# Patient Record
Sex: MALE | Race: WHITE
[De-identification: names, ages, dates, MRNs, and addresses within clinical notes are randomized per-mention and may not be internally consistent; named-entity substitution may affect disease eponyms.]

---

## 2018-04-25 ENCOUNTER — HOSPITAL ENCOUNTER (OUTPATIENT)
Age: 43
Discharge: HOME | End: 2018-04-25

## 2018-04-25 ENCOUNTER — HOSPITAL ENCOUNTER (OUTPATIENT)
Dept: HOSPITAL 91 - SDS | Age: 43
Discharge: HOME | End: 2018-04-25
Payer: COMMERCIAL

## 2018-04-25 DIAGNOSIS — I10: ICD-10-CM

## 2018-04-25 DIAGNOSIS — T84.84XA: Primary | ICD-10-CM
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
## 2018-04-25 PROCEDURE — 20680 REMOVAL OF IMPLANT DEEP: CPT

## 2018-04-25 PROCEDURE — 73560 X-RAY EXAM OF KNEE 1 OR 2: CPT

## 2018-04-25 PROCEDURE — 88300 SURGICAL PATH GROSS: CPT

## 2018-04-25 RX ADMIN — LIDOCAINE HYDROCHLORIDE 1 ML: 10; .005 INJECTION, SOLUTION EPIDURAL; INFILTRATION; INTRACAUDAL; PERINEURAL at 15:39

## 2018-04-25 RX ADMIN — BACITRACIN ZINC, AND POLYMYXIN B SULFATE 1 APPLIC: 500; 10000 OINTMENT TOPICAL at 15:44

## 2018-04-25 RX ADMIN — FENTANYL CITRATE 1 MCG: 50 INJECTION, SOLUTION INTRAMUSCULAR; INTRAVENOUS at 16:24

## 2018-04-25 RX ADMIN — BUPIVACAINE HYDROCHLORIDE 1 ML: 2.5 INJECTION, SOLUTION EPIDURAL; INFILTRATION; INTRACAUDAL; PERINEURAL at 15:39

## 2018-04-25 RX ADMIN — HYDROMORPHONE HYDROCHLORIDE 1 MG: 2 INJECTION INTRAMUSCULAR; INTRAVENOUS; SUBCUTANEOUS at 16:20

## 2018-04-25 RX ADMIN — BACITRACIN 1 ML: 50000 INJECTION, POWDER, FOR SOLUTION INTRAMUSCULAR at 15:39

## 2018-04-25 RX ADMIN — MEPERIDINE HYDROCHLORIDE 1 MG: 25 INJECTION, SOLUTION INTRAMUSCULAR; INTRAVENOUS; SUBCUTANEOUS at 16:24

## 2018-04-25 RX ADMIN — HYDROMORPHONE HYDROCHLORIDE 1 MG: 2 INJECTION INTRAMUSCULAR; INTRAVENOUS; SUBCUTANEOUS at 16:05

## 2018-09-15 ENCOUNTER — HOSPITAL ENCOUNTER (EMERGENCY)
Dept: HOSPITAL 91 - E/R | Age: 43
Discharge: HOME | End: 2018-09-15
Payer: COMMERCIAL

## 2018-09-15 ENCOUNTER — HOSPITAL ENCOUNTER (EMERGENCY)
Age: 43
Discharge: HOME | End: 2018-09-15

## 2018-09-15 DIAGNOSIS — M25.462: Primary | ICD-10-CM

## 2018-09-15 DIAGNOSIS — Z87.891: ICD-10-CM

## 2018-09-15 DIAGNOSIS — M17.12: ICD-10-CM

## 2018-09-15 PROCEDURE — 20610 DRAIN/INJ JOINT/BURSA W/O US: CPT

## 2018-09-15 PROCEDURE — 99283 EMERGENCY DEPT VISIT LOW MDM: CPT

## 2018-09-15 RX ADMIN — LIDOCAINE HYDROCHLORIDE,EPINEPHRINE BITARTRATE 1 ML: 10; .01 INJECTION, SOLUTION INFILTRATION; PERINEURAL at 19:54

## 2019-10-17 ENCOUNTER — HOSPITAL ENCOUNTER (INPATIENT)
Dept: HOSPITAL 54 - ER | Age: 44
LOS: 1 days | Discharge: HOME HEALTH | DRG: 857 | End: 2019-10-18
Attending: NURSE PRACTITIONER | Admitting: NURSE PRACTITIONER
Payer: COMMERCIAL

## 2019-10-17 VITALS — DIASTOLIC BLOOD PRESSURE: 93 MMHG | SYSTOLIC BLOOD PRESSURE: 146 MMHG

## 2019-10-17 VITALS — HEIGHT: 74 IN | BODY MASS INDEX: 23.61 KG/M2 | WEIGHT: 184 LBS
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
## 2019-10-17 DIAGNOSIS — E87.6: ICD-10-CM

## 2019-10-17 DIAGNOSIS — T81.42XA: Primary | ICD-10-CM
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
## 2019-10-17 DIAGNOSIS — S81.002A: ICD-10-CM

## 2019-10-17 DIAGNOSIS — I10: ICD-10-CM
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
## 2019-10-17 DIAGNOSIS — D63.8: ICD-10-CM

## 2019-10-17 DIAGNOSIS — M00.9: ICD-10-CM

## 2019-10-17 LAB
BASOPHILS # BLD AUTO: 0.1 /CMM (ref 0–0.2)
BASOPHILS NFR BLD AUTO: 0.8 % (ref 0–2)
BUN SERPL-MCNC: 11 MG/DL (ref 7–18)
CALCIUM SERPL-MCNC: 8.8 MG/DL (ref 8.5–10.1)
CHLORIDE SERPL-SCNC: 102 MMOL/L (ref 98–107)
CO2 SERPL-SCNC: 24 MMOL/L (ref 21–32)
CREAT SERPL-MCNC: 1.1 MG/DL (ref 0.6–1.3)
EOSINOPHIL NFR BLD AUTO: 0.8 % (ref 0–6)
GLUCOSE SERPL-MCNC: 94 MG/DL (ref 74–106)
HCT VFR BLD AUTO: 37 % (ref 39–51)
HGB BLD-MCNC: 12.7 G/DL (ref 13.5–17.5)
LYMPHOCYTES NFR BLD AUTO: 18.9 % (ref 20–44)
LYMPHOCYTES NFR BLD AUTO: 2 /CMM (ref 0.8–4.8)
MCHC RBC AUTO-ENTMCNC: 34 G/DL (ref 31–36)
MCV RBC AUTO: 95 FL (ref 80–96)
MONOCYTES NFR BLD AUTO: 1.2 /CMM (ref 0.1–1.3)
MONOCYTES NFR BLD AUTO: 11.1 % (ref 2–12)
NEUTROPHILS # BLD AUTO: 7.2 /CMM (ref 1.8–8.9)
NEUTROPHILS NFR BLD AUTO: 68.4 % (ref 43–81)
PLATELET # BLD AUTO: 513 /CMM (ref 150–450)
POTASSIUM SERPL-SCNC: 3.2 MMOL/L (ref 3.5–5.1)
RBC # BLD AUTO: 3.95 MIL/UL (ref 4.5–6)
SODIUM SERPL-SCNC: 137 MMOL/L (ref 136–145)
WBC NRBC COR # BLD AUTO: 10.5 K/UL (ref 4.3–11)

## 2019-10-17 PROCEDURE — A6253 ABSORPT DRG > 48 SQ IN W/O B: HCPCS

## 2019-10-17 PROCEDURE — A4217 STERILE WATER/SALINE, 500 ML: HCPCS

## 2019-10-17 PROCEDURE — G0378 HOSPITAL OBSERVATION PER HR: HCPCS

## 2019-10-17 PROCEDURE — A4216 STERILE WATER/SALINE, 10 ML: HCPCS

## 2019-10-17 NOTE — NUR
PT RECEIVED FROM CLAUDE PEREIRA FOR GOLDY. IN BED AAOX4. NAD. AMBULATED TO BATHROOM ON 
HIS OWN WITH AN AIDE OF A CANE.

## 2019-10-17 NOTE — NUR
RECEIVED PATIENT FROM ER FOR LEFT KNEE WASHING. AO X 3, ABLE TO MAKE NEEDS KNOW; GOOD 
HISTORIAN. NO ACUTE DISTRESS NOTED. MONITORED OF PAIN. IV LINE PATENT, INTACT; FLUSHED. LEFT 
MEDIAL KNEE WOUND COVERED WITH WOUND VAC DRESSING AND ATTACHED TO PORTABLE WOUND VAC; 
DRAINING TEA-COLORED FLUID. OTHERWISE, SKIN IS INTACT. SAFETY REMINDERS GIVEN. ORIENTATION 
TO ROOM AND UNIT GIVEN. ON LOW BED WITH BILATERAL UPPER SIDE RAILS UP. CALL BELL WITHIN EASY 
REACH. WILL CONTINUE TO MONITOR.

## 2019-10-17 NOTE — NUR
PT CAME IN FOR PRE-OP LABS FOR POSSIBLE SURGERY FOR TOMORROW. AAOX4. VSS. NO 
SOB. NO DISTRESS NOTED. PT HAS A MED VAC ON LEFT LEG. WILL CONTINUE TO MONITOR

## 2019-10-17 NOTE — NUR
REPORT GIVEN  TO REGINA, RN FOR GOLDY. PT TO UNIT ON [facility] WITH EMT AT BEDSIDE. PT 
IS STABLE FOR TRANSPORT.

## 2019-10-18 VITALS — SYSTOLIC BLOOD PRESSURE: 154 MMHG | DIASTOLIC BLOOD PRESSURE: 96 MMHG

## 2019-10-18 VITALS — DIASTOLIC BLOOD PRESSURE: 77 MMHG | SYSTOLIC BLOOD PRESSURE: 147 MMHG

## 2019-10-18 LAB
BASOPHILS # BLD AUTO: 0 /CMM (ref 0–0.2)
BASOPHILS NFR BLD AUTO: 0.6 % (ref 0–2)
BUN SERPL-MCNC: 9 MG/DL (ref 7–18)
CALCIUM SERPL-MCNC: 8.6 MG/DL (ref 8.5–10.1)
CHLORIDE SERPL-SCNC: 104 MMOL/L (ref 98–107)
CHOLEST SERPL-MCNC: 121 MG/DL (ref ?–200)
CO2 SERPL-SCNC: 23 MMOL/L (ref 21–32)
CREAT SERPL-MCNC: 0.7 MG/DL (ref 0.6–1.3)
EOSINOPHIL NFR BLD AUTO: 3.5 % (ref 0–6)
GLUCOSE SERPL-MCNC: 109 MG/DL (ref 74–106)
HCT VFR BLD AUTO: 37 % (ref 39–51)
HDLC SERPL-MCNC: 59 MG/DL (ref 40–60)
HGB BLD-MCNC: 12.2 G/DL (ref 13.5–17.5)
LDLC SERPL DIRECT ASSAY-MCNC: 61 MG/DL (ref 0–99)
LYMPHOCYTES NFR BLD AUTO: 2.1 /CMM (ref 0.8–4.8)
LYMPHOCYTES NFR BLD AUTO: 27.2 % (ref 20–44)
MAGNESIUM SERPL-MCNC: 1.6 MG/DL (ref 1.8–2.4)
MCHC RBC AUTO-ENTMCNC: 33 G/DL (ref 31–36)
MCV RBC AUTO: 95 FL (ref 80–96)
MONOCYTES NFR BLD AUTO: 1 /CMM (ref 0.1–1.3)
MONOCYTES NFR BLD AUTO: 12.6 % (ref 2–12)
NEUTROPHILS # BLD AUTO: 4.3 /CMM (ref 1.8–8.9)
NEUTROPHILS NFR BLD AUTO: 56.1 % (ref 43–81)
PHOSPHATE SERPL-MCNC: 3.9 MG/DL (ref 2.5–4.9)
PLATELET # BLD AUTO: 476 /CMM (ref 150–450)
POTASSIUM SERPL-SCNC: 3.9 MMOL/L (ref 3.5–5.1)
RBC # BLD AUTO: 3.87 MIL/UL (ref 4.5–6)
SODIUM SERPL-SCNC: 137 MMOL/L (ref 136–145)
TRIGL SERPL-MCNC: 71 MG/DL (ref 30–150)
WBC NRBC COR # BLD AUTO: 7.6 K/UL (ref 4.3–11)

## 2019-10-18 RX ADMIN — DEXTROSE MONOHYDRATE SCH MLS/HR: 50 INJECTION, SOLUTION INTRAVENOUS at 15:33

## 2019-10-18 RX ADMIN — Medication PRN MG: at 11:12

## 2019-10-18 RX ADMIN — PROPRANOLOL HYDROCHLORIDE SCH MG: 10 TABLET ORAL at 13:00

## 2019-10-18 RX ADMIN — DEXTROSE MONOHYDRATE SCH MLS/HR: 50 INJECTION, SOLUTION INTRAVENOUS at 05:20

## 2019-10-18 RX ADMIN — Medication PRN MG: at 07:00

## 2019-10-18 RX ADMIN — Medication PRN MG: at 02:58

## 2019-10-18 RX ADMIN — PROPRANOLOL HYDROCHLORIDE SCH MG: 10 TABLET ORAL at 09:00

## 2019-10-18 RX ADMIN — MAGNESIUM SULFATE IN DEXTROSE SCH MLS/HR: 10 INJECTION, SOLUTION INTRAVENOUS at 11:07

## 2019-10-18 RX ADMIN — MAGNESIUM SULFATE IN DEXTROSE SCH MLS/HR: 10 INJECTION, SOLUTION INTRAVENOUS at 10:05

## 2019-10-18 NOTE — NUR
RN NOTES

PT RETURNED TO THE UNIT. WOUND VAC IN PLACE. PER DR THOMPSON PT GABBIE TO DISCHARGE. CHANGED DIET 
TO CARDIAC, WILL FED PATIENT. INFORMED DR ZIMMERMAN. PER DR ELSIE CARTWRIGHT TO PUT IN ORDER FOR 
DISCHARGE.

## 2019-10-18 NOTE — NUR
RN DISCHARGE NOTES

PT STABLE AT DISCHARGE. PT WILL GO HOME WITH HOME HEALTH. PT WILL HAVE FOLLOW UP APPOINTMENT 
WITH DR THOMPSON ON 10/24. ALL DISCHARGE PAPERWORK DISCUSSED, SIGNED, AND GIVEN TO THE PATIENT. 
ALL PT BELONGINGS ACCOUNTED FOR AND TAKEN HOME WITH PATIENT. IV AND ID BAND REMOVED AT 
DISCHARGED. PT ACCOMPANIED BY FATHER AND RN, OFF THE UNIT VIA WHEELCHAIR.  

-------------------------------------------------------------------------------

Addendum: 10/18/19 at 1828 by NIESHA MÉNDEZ RN

-------------------------------------------------------------------------------

NO PICTURES TAKEN. PT SP KNEE SURGERY, WITH DRESSING FROM MD. UNABLE TO REMOVE DRESSING. 
WOUND VAC IN PLACE, NO DRAINAGE AT THIS TIME.

## 2019-10-18 NOTE — NUR
PATIENT ASLEEP, EASILY AROUSABLE. RESPIRATIONS EVEN. NO SIGNS OF PAIN NOTED. DUE MED GIVEN 
WITH NO ASE NOTED. IVF INFUSING AS ORDERED. NEEDS ATTENDED. SAFETY PRECAUTIONS AND COMFORT 
MEASURES IN PLACE. WILL GIVE REPORT TO DAY SHIFT FOR CONTINUITY OF CARE.

## 2019-12-02 ENCOUNTER — HOSPITAL ENCOUNTER (INPATIENT)
Dept: HOSPITAL 54 - ER | Age: 44
LOS: 1 days | Discharge: LEFT BEFORE DISCHARGE | DRG: 550 | End: 2019-12-03
Attending: NURSE PRACTITIONER | Admitting: INTERNAL MEDICINE
Payer: COMMERCIAL

## 2019-12-02 VITALS — HEIGHT: 73 IN | BODY MASS INDEX: 23.39 KG/M2 | WEIGHT: 176.5 LBS

## 2019-12-02 VITALS — SYSTOLIC BLOOD PRESSURE: 128 MMHG | DIASTOLIC BLOOD PRESSURE: 82 MMHG

## 2019-12-02 VITALS — DIASTOLIC BLOOD PRESSURE: 79 MMHG | SYSTOLIC BLOOD PRESSURE: 138 MMHG

## 2019-12-02 VITALS — SYSTOLIC BLOOD PRESSURE: 119 MMHG | DIASTOLIC BLOOD PRESSURE: 67 MMHG

## 2019-12-02 DIAGNOSIS — M00.9: Primary | ICD-10-CM

## 2019-12-02 DIAGNOSIS — D47.3: ICD-10-CM

## 2019-12-02 DIAGNOSIS — I10: ICD-10-CM

## 2019-12-02 DIAGNOSIS — Z72.0: ICD-10-CM

## 2019-12-02 LAB
ALBUMIN SERPL BCP-MCNC: 3.5 G/DL (ref 3.4–5)
ALP SERPL-CCNC: 104 U/L (ref 46–116)
ALT SERPL W P-5'-P-CCNC: 34 U/L (ref 12–78)
AST SERPL W P-5'-P-CCNC: 20 U/L (ref 15–37)
BASOPHILS # BLD AUTO: 0.1 /CMM (ref 0–0.2)
BASOPHILS NFR BLD AUTO: 1.1 % (ref 0–2)
BILIRUB DIRECT SERPL-MCNC: 0.1 MG/DL (ref 0–0.2)
BILIRUB SERPL-MCNC: 0.4 MG/DL (ref 0.2–1)
BUN SERPL-MCNC: 13 MG/DL (ref 7–18)
CALCIUM SERPL-MCNC: 9.6 MG/DL (ref 8.5–10.1)
CHLORIDE SERPL-SCNC: 102 MMOL/L (ref 98–107)
CO2 SERPL-SCNC: 24 MMOL/L (ref 21–32)
CREAT SERPL-MCNC: 1.1 MG/DL (ref 0.6–1.3)
EOSINOPHIL NFR BLD AUTO: 2.5 % (ref 0–6)
GLUCOSE SERPL-MCNC: 96 MG/DL (ref 74–106)
HCT VFR BLD AUTO: 41 % (ref 39–51)
HGB BLD-MCNC: 13.6 G/DL (ref 13.5–17.5)
LYMPHOCYTES NFR BLD AUTO: 1.4 /CMM (ref 0.8–4.8)
LYMPHOCYTES NFR BLD AUTO: 18.2 % (ref 20–44)
MCHC RBC AUTO-ENTMCNC: 33 G/DL (ref 31–36)
MCV RBC AUTO: 97 FL (ref 80–96)
MONOCYTES NFR BLD AUTO: 1.1 /CMM (ref 0.1–1.3)
MONOCYTES NFR BLD AUTO: 14.2 % (ref 2–12)
NEUTROPHILS # BLD AUTO: 5 /CMM (ref 1.8–8.9)
NEUTROPHILS NFR BLD AUTO: 64 % (ref 43–81)
PLATELET # BLD AUTO: 623 /CMM (ref 150–450)
POTASSIUM SERPL-SCNC: 4 MMOL/L (ref 3.5–5.1)
PROT SERPL-MCNC: 8.7 G/DL (ref 6.4–8.2)
RBC # BLD AUTO: 4.23 MIL/UL (ref 4.5–6)
SODIUM SERPL-SCNC: 138 MMOL/L (ref 136–145)
WBC NRBC COR # BLD AUTO: 7.9 K/UL (ref 4.3–11)

## 2019-12-02 PROCEDURE — 3E1U48Z IRRIGATION OF JOINTS USING IRRIGATING SUBSTANCE, PERCUTANEOUS ENDOSCOPIC APPROACH: ICD-10-PCS | Performed by: SPECIALIST

## 2019-12-02 PROCEDURE — A4217 STERILE WATER/SALINE, 500 ML: HCPCS

## 2019-12-02 PROCEDURE — 2W1RX6Z COMPRESSION OF LEFT LOWER LEG USING PRESSURE DRESSING: ICD-10-PCS | Performed by: SPECIALIST

## 2019-12-02 PROCEDURE — G0378 HOSPITAL OBSERVATION PER HR: HCPCS

## 2019-12-02 PROCEDURE — A6253 ABSORPT DRG > 48 SQ IN W/O B: HCPCS

## 2019-12-02 RX ADMIN — DEXTROSE MONOHYDRATE SCH MLS/HR: 50 INJECTION, SOLUTION INTRAVENOUS at 23:22

## 2019-12-02 RX ADMIN — Medication PRN MG: at 21:45

## 2019-12-02 RX ADMIN — Medication PRN EACH: at 23:23

## 2019-12-02 RX ADMIN — Medication PRN EACH: at 18:27

## 2019-12-02 RX ADMIN — PROPRANOLOL HYDROCHLORIDE SCH MG: 10 TABLET ORAL at 16:31

## 2019-12-02 RX ADMIN — NICOTINE SCH MG: 21 PATCH TRANSDERMAL at 16:31

## 2019-12-02 RX ADMIN — DEXTROSE MONOHYDRATE SCH MLS/HR: 50 INJECTION, SOLUTION INTRAVENOUS at 16:31

## 2019-12-02 RX ADMIN — DEXTROSE AND SODIUM CHLORIDE PRN MLS/HR: 5; 450 INJECTION, SOLUTION INTRAVENOUS at 12:18

## 2019-12-02 NOTE — NUR
MS RN OPENING NOTES



Received patient A/O x4, awake on bed, on RA, no SOB/respiratory distress noted. With wound 
vac on L knee, settings noted, with scant serosanguineous output noted. With minimal pain on 
L knee on tolerable level. Kept on bed clean, dry and comfortable. Call light within easy 
reach.

## 2019-12-02 NOTE — NUR
MS RN NOTES

PATIENT CAME BACK FROM RECOVERY ROOM WITH STABLE VITAL SIGNS. ALERT ORIENTED X 4. NO ACUTE 
DISTRESS NOTED. BREATHING UNLABORED. NO SOB NOTED. LEFT KNEE SURGERY SITE DRESSING INTACT 
CLEAN AND DRY WITH WOUND VACUUM ATTACHED.  RECEIVED NEW ORDERS FROM TAWANA PUGA  
PATIENT MAY HAVE CARDIAC REGULAR DIET AND RESUME PREOP ORDERS. NOTED AND CARRIED OUT. WILL 
CONTINUE TO MONITOR PATIENT.

## 2019-12-02 NOTE — NUR
MS RN NOTES

PATIENT ADMITTED FROM ER REPORT GIVEN BY SAGAR ARCE. PATIENT ALERT ORIENTED X 4. NO ACUTE 
DISTRESS NOTED. PATIENT TOLERATING LEFT KNEE PAIN 2/10 AT THIS TIME. IV ACCESS PATENT AND 
INTACT, NO REDNESS OR SWELLING NOTED. LEFT MEDIAL KNEE WITH WOUND VACUUM INTACT WITH 
SEROSANGUINEOUS DRAINAGE. ORIENTED TO THE ROOM. NEEDS ATTENDED. SAFETY MEASURES IN PLACE. 
CALL LIGHT WITHIN REACH. WILL CONTINUE TO MONITOR ACCORDINGLY.

## 2019-12-02 NOTE — NUR
patient came in to the ER c/o left knee pain, sent by Dr. Toribio, on wound vac. 
Ambulatory with cane, on room air, breathing evenly and unlabored. connected to 
the monitor and pulse ox. kept comfortable, will continue to monitor 
accordingly.

## 2019-12-02 NOTE — NUR
MS RN NOTES

PATIENT IN BED ALERT ORIENTED X 4. NO ACUTE DISTRESS NOTED. NO SOB NOTED.  IV ACCESS PATENT 
AND INTACT, NO REDNESS OR SWELLING NOTED. LEFT MEDIAL KNEE WITH WOUND VACUUM INTACT WITH 
SEROSANGUINEOUS DRAINAGE. VITAL SIGNS REMAIN STABLE THROUGH OUT THE SHIFT. NEEDS ATTENDED 
AND ANTICIPATED. SAFETY MEASURES IN PLACE. CALL LIGHT WITHIN REACH. WILL ENDORSE TO NIGHT 
NURSE FOR CONTINUITY OF CARE.

## 2019-12-03 VITALS — SYSTOLIC BLOOD PRESSURE: 144 MMHG | DIASTOLIC BLOOD PRESSURE: 76 MMHG

## 2019-12-03 VITALS — SYSTOLIC BLOOD PRESSURE: 126 MMHG | DIASTOLIC BLOOD PRESSURE: 74 MMHG

## 2019-12-03 LAB
BASOPHILS # BLD AUTO: 0 /CMM (ref 0–0.2)
BASOPHILS NFR BLD AUTO: 0.5 % (ref 0–2)
BUN SERPL-MCNC: 8 MG/DL (ref 7–18)
CALCIUM SERPL-MCNC: 9.1 MG/DL (ref 8.5–10.1)
CHLORIDE SERPL-SCNC: 103 MMOL/L (ref 98–107)
CHOLEST SERPL-MCNC: 166 MG/DL (ref ?–200)
CO2 SERPL-SCNC: 21 MMOL/L (ref 21–32)
CREAT SERPL-MCNC: 0.8 MG/DL (ref 0.6–1.3)
EOSINOPHIL NFR BLD AUTO: 0.5 % (ref 0–6)
GLUCOSE SERPL-MCNC: 130 MG/DL (ref 74–106)
HCT VFR BLD AUTO: 40 % (ref 39–51)
HDLC SERPL-MCNC: 56 MG/DL (ref 40–60)
HGB BLD-MCNC: 13.3 G/DL (ref 13.5–17.5)
LDLC SERPL DIRECT ASSAY-MCNC: 90 MG/DL (ref 0–99)
LYMPHOCYTES NFR BLD AUTO: 1.4 /CMM (ref 0.8–4.8)
LYMPHOCYTES NFR BLD AUTO: 14 % (ref 20–44)
MAGNESIUM SERPL-MCNC: 1.9 MG/DL (ref 1.8–2.4)
MCHC RBC AUTO-ENTMCNC: 34 G/DL (ref 31–36)
MCV RBC AUTO: 96 FL (ref 80–96)
MONOCYTES NFR BLD AUTO: 1.2 /CMM (ref 0.1–1.3)
MONOCYTES NFR BLD AUTO: 11.9 % (ref 2–12)
NEUTROPHILS # BLD AUTO: 7.4 /CMM (ref 1.8–8.9)
NEUTROPHILS NFR BLD AUTO: 73.1 % (ref 43–81)
PHOSPHATE SERPL-MCNC: 3 MG/DL (ref 2.5–4.9)
PLATELET # BLD AUTO: 539 /CMM (ref 150–450)
POTASSIUM SERPL-SCNC: 4 MMOL/L (ref 3.5–5.1)
RBC # BLD AUTO: 4.13 MIL/UL (ref 4.5–6)
SODIUM SERPL-SCNC: 139 MMOL/L (ref 136–145)
TRIGL SERPL-MCNC: 109 MG/DL (ref 30–150)
TSH SERPL DL<=0.005 MIU/L-ACNC: 0.91 UIU/ML (ref 0.36–3.74)
WBC NRBC COR # BLD AUTO: 10.1 K/UL (ref 4.3–11)

## 2019-12-03 RX ADMIN — DEXTROSE AND SODIUM CHLORIDE PRN MLS/HR: 5; 450 INJECTION, SOLUTION INTRAVENOUS at 06:03

## 2019-12-03 RX ADMIN — Medication PRN MG: at 01:20

## 2019-12-03 RX ADMIN — PROPRANOLOL HYDROCHLORIDE SCH MG: 10 TABLET ORAL at 08:26

## 2019-12-03 RX ADMIN — Medication PRN MG: at 06:03

## 2019-12-03 RX ADMIN — PROPRANOLOL HYDROCHLORIDE SCH MG: 10 TABLET ORAL at 12:53

## 2019-12-03 RX ADMIN — Medication PRN MG: at 09:54

## 2019-12-03 RX ADMIN — NICOTINE SCH MG: 21 PATCH TRANSDERMAL at 08:26

## 2019-12-03 RX ADMIN — DEXTROSE MONOHYDRATE SCH MLS/HR: 50 INJECTION, SOLUTION INTRAVENOUS at 08:47

## 2019-12-03 NOTE — NUR
MS/RN NOTES:



PATIENT COMPLAINED OF PAIN LEVEL OF 8 ON THE LEFT LOWER KNEE. MORPHINE 2MG IV WAS GIVEN AT 
0120. WILL REASSESS PATIENT ACCORDINGLY.

## 2019-12-03 NOTE — NUR
MS/RN NOTES:



PATIENT'S PAIN LEVEL DECREASES TO 4. PATIENT IS IN A STABLE CONDITION. WILL CONTINUE TO 
MONITOR PT. ACCORDINGLY.

## 2019-12-03 NOTE — NUR
WOUND CARE CONSULT: PT SEEN AT REQUEST OF NURSING STAFF. PT PRESENTS WITH ORTHO DRESSING TO 
LEFT KNEE WITH KCI VAC AT 125mmHg CONTINUOUS SETTING. NO DRAINAGE NOTED IN CANISTER.

## 2019-12-03 NOTE — NUR
RN MS NOTES

PT IN BED, AWAKE, ALERT AND ORIENTED, PAIN MEDS GIVEN FOR PAIN MANAGEMENT, NOT IN DISTRESS, 
LEFT KNEE WOUND CONNECTED TO WOUND VAC, NO DRAIN NOTED AT THIS TIME, PT WOUND LIKE TO SEE 
DR. THOMPSON, CALLED DR. THOMPSON' OFFICE, PER SURGERY COORDINATOR GENO, SHE ALREADY INFORMED 
DR. THOMPSON BUT WILL REMIND HIM AGAIN, PT STATED THAT HE NEEDS TO GO HOME TO TAKE CARE OF HIS 
DAD'S MEDS AND OTHER THINGS, EXPLAINED TO PT THAT IT WILL BE GOING AGAINST MEDICAL ADVISE, 
PT STATES THAT HE IS AWARE OF IT, DR. THOMPSON MADE AWARE OF PT'S PLAN THROUGH GENO, PT 
SEEN AND EXAMINED BY [facility] NURSE AND MIGUELITO JEAN BAPTISTE, RECOMMENDATIONS GIVEN, PT'S WOUND 
VAC REPLACED WITH A SMALLER ONE HE CAN TAKE HOME, HOSPITAL WOUND VAC REMOVED AND PLACED IN 
THE DIRTY UTILITY ROOM TO BE PICKED UP BY [employer], REMINDED PT TO FOLLOW UP WITH DR. THOMPSON TOMORROW OR GO TO THE NEAREST E.R. IN CASE OF EMERGENCY, PT STATES THAT HE WILL SEE 
DR. THOMPSON TOMORROW, PICTURE TAKEN OF PT'S LEFT KNEE WOUNDS, APPLIED STERILE DRESSINGS, 
BELONGINGS ACCOUNTED FOR, PT PICKED UP BY HIS DAD, LEFT IN STABLE CONDITION, DR. SOTO 
INFORMED OF PT'S LEAVING AMA.

## 2019-12-03 NOTE — NUR
RN MS NOTES

PT IN BED, AWAKE, ALERT AND ORIENTED, WITH COMPLAINT OF PAIN 5/10 TO LEFT KNEE, NOT IN 
DISTRESS, CALL LIGHT WITHIN REACH, NEEDS ATTENDED.

## 2019-12-03 NOTE — NUR
MS/RN NOTES:



REPORT GIVEN BY ROX FOR GOLDY. RECEIVED PATIENT IN A STABLE CONDITION. A/OX3. VERBALLY 
RESPONSIVE AND BALE TO MAKE NEEDS KNOWN. ON ROOM AIR, NO SOB NOTED. NO S/S OF ACUTE 
DISTRESS. BREATHING EVEN AND UNLABORED. SAFETY PRECAUTIONS KEPT IN PLACE. WILL CONTINUE TO 
MONITOR PT ACCORDINGLY.

## 2019-12-03 NOTE — NUR
MS/RN CLOSING NOTES:



PATIENT IN A STABLE CONDITION. AWAKE IN BED WATCHING TV. A/OX3. ON ROOM AIR, NO SOB NOTED. 
NO S/S OF ACUTE DISTRESS. BREATHING EVEN AND UNLABORED. SAFETY PRECAUTIONS KEPT IN PLACE. 
ALL DUE MED GIVEN AS ORDERED. ALL NURSING NEEDS MET AND PROVIDED. WILL ENDORSE TO DAY SHIFT 
NURSE FOR GOLDY.

## 2019-12-03 NOTE — NUR
WOUND CARE: PT SEEN WITH PLASTIC SURGERY DEBO GOMEZ. LEFT MEDIAL KNEE WOUND DRESSING 
CHANGED AND PHOTO TAKEN BY RN. WOUND MEASURES 2.5CM X 1.5CM X 0.1CM, PINK/RED IN COLOR WITH 
SCANT PINK DRAINAGE, NO ODOR, NO PERIWOUND ERYTHEMA. LEFT ANTERIOR KNEE HAS 2 SUTURES, NO 
DRAINAGE AND LEFT LATERAL KNEE HAS WOUND WHICH MEASURES 1CM X 0.5CM X 0.1CM, RED IN COLOR, 
NO DRAINAGE NOTED, NO ODOR. SILVER FOAM DRESSING WAS IN PLACE FOR LEFT LATERAL KNEE. VAC 
DRESSING  CHANGED ON LEFT MEDIAL KNEE USING SKIN PREP AND VAC DRAPE TO PERIWOUND, GRANUFOAM 
TO WOUND (ONE PIECE ONLY). VAC AT 125mmHg CONTINUOUS SETTING. PT TO HAVE HIS HOME VAC 
EXCHANGED BY [facility] REP BEFORE DISCHARGE FROM HOSPITAL. PT IS NOW ON HOSPITAL VAC ([facility]). KERLIX 
WRAP AND GENTLE ACE WRAP APPLIED OVER VAC DRESSING. PT TOLERATED WELL. WILL SEE PRN.

## 2020-04-16 ENCOUNTER — HOSPITAL ENCOUNTER (INPATIENT)
Dept: HOSPITAL 54 - ER | Age: 45
LOS: 3 days | Discharge: LEFT BEFORE DISCHARGE | DRG: 549 | End: 2020-04-19
Attending: NURSE PRACTITIONER | Admitting: NURSE PRACTITIONER
Payer: COMMERCIAL

## 2020-04-16 VITALS — WEIGHT: 196 LBS | HEIGHT: 73 IN | BODY MASS INDEX: 25.98 KG/M2

## 2020-04-16 DIAGNOSIS — M00.9: Primary | ICD-10-CM

## 2020-04-16 DIAGNOSIS — M25.462: ICD-10-CM

## 2020-04-16 DIAGNOSIS — L03.116: ICD-10-CM

## 2020-04-16 DIAGNOSIS — F17.210: ICD-10-CM

## 2020-04-16 DIAGNOSIS — I10: ICD-10-CM

## 2020-04-16 LAB
BASOPHILS # BLD AUTO: 0.1 /CMM (ref 0–0.2)
BASOPHILS NFR BLD AUTO: 0.4 % (ref 0–2)
BUN SERPL-MCNC: 21 MG/DL (ref 7–18)
CALCIUM SERPL-MCNC: 9 MG/DL (ref 8.5–10.1)
CHLORIDE SERPL-SCNC: 102 MMOL/L (ref 98–107)
CO2 SERPL-SCNC: 27 MMOL/L (ref 21–32)
CREAT SERPL-MCNC: 1 MG/DL (ref 0.6–1.3)
EOSINOPHIL NFR BLD AUTO: 0.6 % (ref 0–6)
GLUCOSE SERPL-MCNC: 135 MG/DL (ref 74–106)
HCT VFR BLD AUTO: 36 % (ref 39–51)
HGB BLD-MCNC: 12.5 G/DL (ref 13.5–17.5)
LYMPHOCYTES NFR BLD AUTO: 1.5 /CMM (ref 0.8–4.8)
LYMPHOCYTES NFR BLD AUTO: 9.7 % (ref 20–44)
MCHC RBC AUTO-ENTMCNC: 35 G/DL (ref 31–36)
MCV RBC AUTO: 96 FL (ref 80–96)
MONOCYTES NFR BLD AUTO: 1.9 /CMM (ref 0.1–1.3)
MONOCYTES NFR BLD AUTO: 12.3 % (ref 2–12)
NEUTROPHILS # BLD AUTO: 11.8 /CMM (ref 1.8–8.9)
NEUTROPHILS NFR BLD AUTO: 77 % (ref 43–81)
PLATELET # BLD AUTO: 428 /CMM (ref 150–450)
POTASSIUM SERPL-SCNC: 4 MMOL/L (ref 3.5–5.1)
RBC # BLD AUTO: 3.78 MIL/UL (ref 4.5–6)
SODIUM SERPL-SCNC: 138 MMOL/L (ref 136–145)
WBC NRBC COR # BLD AUTO: 15.4 K/UL (ref 4.3–11)

## 2020-04-16 PROCEDURE — A4217 STERILE WATER/SALINE, 500 ML: HCPCS

## 2020-04-16 PROCEDURE — C1751 CATH, INF, PER/CENT/MIDLINE: HCPCS

## 2020-04-16 PROCEDURE — A6253 ABSORPT DRG > 48 SQ IN W/O B: HCPCS

## 2020-04-16 PROCEDURE — G0378 HOSPITAL OBSERVATION PER HR: HCPCS

## 2020-04-16 NOTE — NUR
PT CAME TO THE ED C/O L KNEE PAIN. +SWELLING, +REDNESS. PT UNABLE TO BEND HIS L 
KNEE AS WELL. PT AAOX4, RR EVEN AND UNLABORED ON RA W NAD NOTED. PT CONNECTED 
TO THE MONITOR AND POX. AWAITING FOR MD JORDAN. FALL PRECAUTIONS IMPLEMENTED

## 2020-04-17 VITALS — DIASTOLIC BLOOD PRESSURE: 73 MMHG | SYSTOLIC BLOOD PRESSURE: 146 MMHG

## 2020-04-17 VITALS — DIASTOLIC BLOOD PRESSURE: 77 MMHG | SYSTOLIC BLOOD PRESSURE: 137 MMHG

## 2020-04-17 VITALS — SYSTOLIC BLOOD PRESSURE: 144 MMHG | DIASTOLIC BLOOD PRESSURE: 52 MMHG

## 2020-04-17 VITALS — DIASTOLIC BLOOD PRESSURE: 91 MMHG | SYSTOLIC BLOOD PRESSURE: 135 MMHG

## 2020-04-17 RX ADMIN — Medication PRN MG: at 21:23

## 2020-04-17 RX ADMIN — Medication PRN MG: at 10:29

## 2020-04-17 RX ADMIN — Medication PRN MG: at 17:19

## 2020-04-17 RX ADMIN — PANTOPRAZOLE SODIUM SCH MG: 40 TABLET, DELAYED RELEASE ORAL at 07:30

## 2020-04-17 RX ADMIN — SODIUM CHLORIDE PRN MLS/HR: 9 INJECTION, SOLUTION INTRAVENOUS at 21:23

## 2020-04-17 RX ADMIN — PIPERACILLIN SODIUM AND TAZOBACTAM SODIUM SCH MLS/HR: .375; 3 INJECTION, POWDER, LYOPHILIZED, FOR SOLUTION INTRAVENOUS at 09:23

## 2020-04-17 RX ADMIN — NICOTINE SCH MG: 21 PATCH TRANSDERMAL at 17:51

## 2020-04-17 RX ADMIN — SODIUM CHLORIDE PRN MLS/HR: 9 INJECTION, SOLUTION INTRAVENOUS at 03:03

## 2020-04-17 RX ADMIN — PIPERACILLIN SODIUM AND TAZOBACTAM SODIUM SCH MLS/HR: .375; 3 INJECTION, POWDER, LYOPHILIZED, FOR SOLUTION INTRAVENOUS at 17:06

## 2020-04-17 RX ADMIN — DEXTROSE MONOHYDRATE SCH MLS/HR: 50 INJECTION, SOLUTION INTRAVENOUS at 08:16

## 2020-04-17 RX ADMIN — Medication PRN MG: at 02:21

## 2020-04-17 RX ADMIN — DEXTROSE MONOHYDRATE SCH MLS/HR: 50 INJECTION, SOLUTION INTRAVENOUS at 15:21

## 2020-04-17 RX ADMIN — Medication PRN MG: at 06:17

## 2020-04-17 NOTE — NUR
RN NOTES



PATIENT CAMBACK FROM SURGERY, NO SIGNS OF DISTRESS NOTED. RESUME ALL PREOP ORDERS, FOR ID 
CONSULT. WILL CONTINUE TO MONITOR.

## 2020-04-17 NOTE — NUR
RN NOTES

COMPLAINED OF LEFT KNEE PAIN- MORPHINE 4 MG IV GIVEN AS ORDERED, B/S STABLE, MORNING CARE 
RENDERED, CALL LIGHT WITHIN REACH, [id number], PT. NEEDS ATTENDED

## 2020-04-17 NOTE — NUR
RN NOTES

SPOKE TO DR. AVITIA AND INFORMED HIM THAT PT. IS REFUSING NORCO 10/325MG PO. PER PT "IT'S NOT 
GOING TO WORK AND HE STATED THAT HE GOT 4 MG OF MORPHINE IV IN ER". DR. AVITIA ORDERED 
MORPHINE 4 MG IV Q 4 HRS PRN, ORDER NOTED AND CARRIED OUT

## 2020-04-17 NOTE — NUR
RN NOTES

RECEIVED PT. FROM ER WITH C/O OF LEFT KNEE PAIN-, PER PATIENT HE'S SEEN BY DR. THOMPSON LAST 
WEEK AND WAS PRESCRIBED WITH ANTIBIOTIC BUT AFTER HE FINISHED HIS ANTIBIOTIC , HE NOTICED 
HIS LEFT KNEE START TO GET SWOLLEN BUT NOTICED HIS LEFT KNEE IS A LITTLE BIT SWOLLEN, 
ADMISSION INSTRUCTION WAS RENDERED, PT. REFUSED TO TURN ON HIS BACK FOR BODY CHECK , PER PT 
HE'S BACK IS CLEAN, CALL LIGHT WITHIN REACH, [id number], WILL CONTINUE TO MONITOR

## 2020-04-17 NOTE — NUR
MS RN NOTES



RECEIVED PT  IN BED RESTING COMFORTABLY. A/O X3 AND ABLE TO MAKE NEEDS KNOWN . NO SIGNS OF 
DISTRESS NOTED THROUGHOUT THE SHIFT. S/P LEFT KNEE ARTHROSCOPIC, I & D WITH DR. THOMPSON. IV 
FLUIDS ON RFA #20, RFA #20, INTACT, PATENT AND INFUSING WELL. SAFETY MEASURES IN PLACE WITH 
BED IN LOWEST LOCKED POSITION AND SIDE RAILS UP X 2.  CALL LIGHT WITHIN REACH. WILL CONTINUE 
TO MONITOR.

## 2020-04-17 NOTE — NUR
MS RN NOTES



PATIENT IN BED RESTING COMFORTABLY IN MODERATE HIGH BACK REST. A/O X4. ABLE TO MAKE NEEDS 
KNOWN . NO SIGNS OF DISTRESS NOTED THROUGHOUT THE SHIFT. S/P LEFT KNEE ARTHROSCOPIC, I & D 
WITH DR. THOMPSON. IV FLUIDS ON RAC #18, RFA #20, INTACT, PATENT AND INFUSING WELL. SAFETY 
MEASURES IN [place], BED IN LOWEST LOCKED POSITION, SIDE RAIL UP X 2, CALL LIGHT WITHIN REACH. 
WILL ENDORSE TO NIGHT SHIFT NURSE FOR GOLDY.

## 2020-04-17 NOTE — NUR
MS RN NOTES



RECEIVED PATIENT IN BED RESTING COMFORTABLY IN MODERATE HIGH BACK REST. A/O X4. ABLE TO MAKE 
NEEDS KNOWN . NO SIGNS OF DISTRESS NOTED AT THIS TIME. IV FLUIDS ON RAC #18 INTACT, PATENT 
AND INFUSING WELL. SAFETY MEASURES IN [place], BED IN LOWEST LOCKED POSITION, SIDE RAIL UP X 
2, CALL LIGHT WITHIN REACH. WILL CONTINUE TO MONITOR.

## 2020-04-18 VITALS — SYSTOLIC BLOOD PRESSURE: 139 MMHG | DIASTOLIC BLOOD PRESSURE: 82 MMHG

## 2020-04-18 VITALS — DIASTOLIC BLOOD PRESSURE: 56 MMHG | SYSTOLIC BLOOD PRESSURE: 125 MMHG

## 2020-04-18 VITALS — DIASTOLIC BLOOD PRESSURE: 70 MMHG | SYSTOLIC BLOOD PRESSURE: 117 MMHG

## 2020-04-18 LAB
ALBUMIN SERPL BCP-MCNC: 2.9 G/DL (ref 3.4–5)
ALP SERPL-CCNC: 79 U/L (ref 46–116)
ALT SERPL W P-5'-P-CCNC: 22 U/L (ref 12–78)
AST SERPL W P-5'-P-CCNC: 14 U/L (ref 15–37)
BASOPHILS # BLD AUTO: 0 /CMM (ref 0–0.2)
BASOPHILS NFR BLD AUTO: 0.3 % (ref 0–2)
BILIRUB SERPL-MCNC: 0.9 MG/DL (ref 0.2–1)
BUN SERPL-MCNC: 8 MG/DL (ref 7–18)
CALCIUM SERPL-MCNC: 8.5 MG/DL (ref 8.5–10.1)
CHLORIDE SERPL-SCNC: 101 MMOL/L (ref 98–107)
CHOLEST SERPL-MCNC: 133 MG/DL (ref ?–200)
CO2 SERPL-SCNC: 23 MMOL/L (ref 21–32)
CREAT SERPL-MCNC: 1 MG/DL (ref 0.6–1.3)
EOSINOPHIL NFR BLD AUTO: 0.2 % (ref 0–6)
GLUCOSE SERPL-MCNC: 150 MG/DL (ref 74–106)
HCT VFR BLD AUTO: 36 % (ref 39–51)
HDLC SERPL-MCNC: 80 MG/DL (ref 40–60)
HGB BLD-MCNC: 12.4 G/DL (ref 13.5–17.5)
LDLC SERPL DIRECT ASSAY-MCNC: 41 MG/DL (ref 0–99)
LYMPHOCYTES NFR BLD AUTO: 1.1 /CMM (ref 0.8–4.8)
LYMPHOCYTES NFR BLD AUTO: 7.6 % (ref 20–44)
MAGNESIUM SERPL-MCNC: 1.6 MG/DL (ref 1.8–2.4)
MCHC RBC AUTO-ENTMCNC: 34 G/DL (ref 31–36)
MCV RBC AUTO: 95 FL (ref 80–96)
MONOCYTES NFR BLD AUTO: 15.5 % (ref 2–12)
MONOCYTES NFR BLD AUTO: 2.2 /CMM (ref 0.1–1.3)
NEUTROPHILS # BLD AUTO: 11 /CMM (ref 1.8–8.9)
NEUTROPHILS NFR BLD AUTO: 76.4 % (ref 43–81)
PHOSPHATE SERPL-MCNC: 2.9 MG/DL (ref 2.5–4.9)
PLATELET # BLD AUTO: 362 /CMM (ref 150–450)
POTASSIUM SERPL-SCNC: 3.6 MMOL/L (ref 3.5–5.1)
PROT SERPL-MCNC: 7.1 G/DL (ref 6.4–8.2)
RBC # BLD AUTO: 3.8 MIL/UL (ref 4.5–6)
SODIUM SERPL-SCNC: 134 MMOL/L (ref 136–145)
TRIGL SERPL-MCNC: 70 MG/DL (ref 30–150)
TSH SERPL DL<=0.005 MIU/L-ACNC: 1.1 UIU/ML (ref 0.36–3.74)
WBC NRBC COR # BLD AUTO: 14.4 K/UL (ref 4.3–11)

## 2020-04-18 RX ADMIN — DEXTROSE MONOHYDRATE SCH MLS/HR: 50 INJECTION, SOLUTION INTRAVENOUS at 20:48

## 2020-04-18 RX ADMIN — NICOTINE SCH MG: 21 PATCH TRANSDERMAL at 18:32

## 2020-04-18 RX ADMIN — Medication PRN MG: at 05:21

## 2020-04-18 RX ADMIN — PIPERACILLIN SODIUM AND TAZOBACTAM SODIUM SCH MLS/HR: .375; 3 INJECTION, POWDER, LYOPHILIZED, FOR SOLUTION INTRAVENOUS at 03:04

## 2020-04-18 RX ADMIN — Medication PRN MG: at 13:34

## 2020-04-18 RX ADMIN — DEXTROSE MONOHYDRATE SCH MLS/HR: 50 INJECTION, SOLUTION INTRAVENOUS at 23:23

## 2020-04-18 RX ADMIN — MAGNESIUM SULFATE IN DEXTROSE SCH MLS/HR: 10 INJECTION, SOLUTION INTRAVENOUS at 11:13

## 2020-04-18 RX ADMIN — DEXTROSE MONOHYDRATE SCH MLS/HR: 50 INJECTION, SOLUTION INTRAVENOUS at 08:08

## 2020-04-18 RX ADMIN — Medication PRN MG: at 01:26

## 2020-04-18 RX ADMIN — Medication PRN MG: at 09:44

## 2020-04-18 RX ADMIN — MAGNESIUM SULFATE IN DEXTROSE SCH MLS/HR: 10 INJECTION, SOLUTION INTRAVENOUS at 12:23

## 2020-04-18 RX ADMIN — DEXTROSE MONOHYDRATE SCH MLS/HR: 50 INJECTION, SOLUTION INTRAVENOUS at 15:58

## 2020-04-18 RX ADMIN — Medication PRN MG: at 17:30

## 2020-04-18 RX ADMIN — DEXTROSE MONOHYDRATE SCH MLS/HR: 50 INJECTION, SOLUTION INTRAVENOUS at 00:14

## 2020-04-18 RX ADMIN — PANTOPRAZOLE SODIUM SCH MG: 40 TABLET, DELAYED RELEASE ORAL at 07:40

## 2020-04-18 RX ADMIN — PIPERACILLIN SODIUM AND TAZOBACTAM SODIUM SCH MLS/HR: .375; 3 INJECTION, POWDER, LYOPHILIZED, FOR SOLUTION INTRAVENOUS at 10:32

## 2020-04-18 RX ADMIN — PIPERACILLIN SODIUM AND TAZOBACTAM SODIUM SCH MLS/HR: .375; 3 INJECTION, POWDER, LYOPHILIZED, FOR SOLUTION INTRAVENOUS at 18:31

## 2020-04-18 RX ADMIN — Medication PRN MG: at 21:30

## 2020-04-18 NOTE — NUR
MS RN NOTES



RECEIVED PT  IN BED RESTING COMFORTABLY. A/O X3 AND ABLE TO MAKE NEEDS KNOWN . NO SIGNS OF 
DISTRESS NOTED THROUGHOUT THE SHIFT. IV FLUIDS ON RFA #20, RFA #20, INTACT, PATENT AND 
INFUSING WELL. SAFETY MEASURES IN PLACE WITH BED IN LOWEST LOCKED POSITION AND SIDE RAILS UP 
X 2.  CALL LIGHT WITHIN REACH. WILL CONTINUE TO MONITOR.

## 2020-04-18 NOTE — NUR
MS RN- CLOSING NOTES



ENDORSED TO NIGHT SHIFT NURSE IN BED, AWAKE, CONSCIOUS, COOPERATIVE, NO SIGNS OF RESPIRATORY 
DISTRESS, IVF RFA 20G NS AT 75ML/HR INFUSING WELL, NO SIGNS OF INFILTRATION OR REDNESS, S/P 
LEFT KNEE INCISION AND DRAINAGE, NO SIGNS OF BLEEDING NOTED.

## 2020-04-18 NOTE — NUR
MS RN- OPENING NOTES



RECEIVED PATIENT FROM NIGHT SHIFT NURSE IN BED, AWAKE, CONSCIOUS, COOPERATIVE, IVF RIGHT FA 
NS AT 75 ML/HR INFUSING WELL, NO REDNESS OR INFILTRATION NOTED, S/P LEFT KNEE I & D, NO 
SIGNS OF BLEEDING NOTED, NO SIGNS OF RESPIRATORY DISTRESS.

## 2020-04-18 NOTE — NUR
MS RN NOTES



PT  IN BED RESTING COMFORTABLY. A/O X3 AND ABLE TO MAKE NEEDS KNOWN . NO SIGNS OF DISTRESS 
NOTED THROUGHOUT THE SHIFT. NO COMPLAINTS OF PAIN AT THIS TIME. IV FLUIDS ON RFA #20, RFA 
#20, INTACT, PATENT AND INFUSING WELL. SAFETY MEASURES IN PLACE WITH BED IN LOWEST LOCKED 
POSITION AND SIDE RAILS UP X 2.  CALL LIGHT WITHIN REACH. WILL ENDORSE TO ONCOMING NURSE FOR 
GOLDY.

## 2020-04-19 VITALS — DIASTOLIC BLOOD PRESSURE: 82 MMHG | SYSTOLIC BLOOD PRESSURE: 136 MMHG

## 2020-04-19 VITALS — DIASTOLIC BLOOD PRESSURE: 84 MMHG | SYSTOLIC BLOOD PRESSURE: 151 MMHG

## 2020-04-19 LAB
BASOPHILS # BLD AUTO: 0 /CMM (ref 0–0.2)
BASOPHILS NFR BLD AUTO: 0.5 % (ref 0–2)
BUN SERPL-MCNC: 8 MG/DL (ref 7–18)
CALCIUM SERPL-MCNC: 9 MG/DL (ref 8.5–10.1)
CHLORIDE SERPL-SCNC: 103 MMOL/L (ref 98–107)
CO2 SERPL-SCNC: 25 MMOL/L (ref 21–32)
CREAT SERPL-MCNC: 0.8 MG/DL (ref 0.6–1.3)
EOSINOPHIL NFR BLD AUTO: 2.1 % (ref 0–6)
GLUCOSE SERPL-MCNC: 99 MG/DL (ref 74–106)
HCT VFR BLD AUTO: 39 % (ref 39–51)
HGB BLD-MCNC: 13 G/DL (ref 13.5–17.5)
LYMPHOCYTES NFR BLD AUTO: 1.7 /CMM (ref 0.8–4.8)
LYMPHOCYTES NFR BLD AUTO: 18.3 % (ref 20–44)
MAGNESIUM SERPL-MCNC: 2.1 MG/DL (ref 1.8–2.4)
MCHC RBC AUTO-ENTMCNC: 33 G/DL (ref 31–36)
MCV RBC AUTO: 96 FL (ref 80–96)
MONOCYTES NFR BLD AUTO: 1.3 /CMM (ref 0.1–1.3)
MONOCYTES NFR BLD AUTO: 13.8 % (ref 2–12)
NEUTROPHILS # BLD AUTO: 6 /CMM (ref 1.8–8.9)
NEUTROPHILS NFR BLD AUTO: 65.3 % (ref 43–81)
PHOSPHATE SERPL-MCNC: 3.2 MG/DL (ref 2.5–4.9)
PLATELET # BLD AUTO: 364 /CMM (ref 150–450)
POTASSIUM SERPL-SCNC: 4 MMOL/L (ref 3.5–5.1)
RBC # BLD AUTO: 4.03 MIL/UL (ref 4.5–6)
SODIUM SERPL-SCNC: 138 MMOL/L (ref 136–145)
WBC NRBC COR # BLD AUTO: 9.3 K/UL (ref 4.3–11)

## 2020-04-19 RX ADMIN — Medication PRN MG: at 01:35

## 2020-04-19 RX ADMIN — Medication PRN MG: at 13:53

## 2020-04-19 RX ADMIN — DEXTROSE MONOHYDRATE SCH MLS/HR: 50 INJECTION, SOLUTION INTRAVENOUS at 16:08

## 2020-04-19 RX ADMIN — DEXTROSE MONOHYDRATE SCH MLS/HR: 50 INJECTION, SOLUTION INTRAVENOUS at 07:49

## 2020-04-19 RX ADMIN — DEXTROSE MONOHYDRATE SCH MLS/HR: 50 INJECTION, SOLUTION INTRAVENOUS at 20:00

## 2020-04-19 RX ADMIN — Medication PRN MG: at 18:19

## 2020-04-19 RX ADMIN — SODIUM CHLORIDE PRN MLS/HR: 9 INJECTION, SOLUTION INTRAVENOUS at 02:40

## 2020-04-19 RX ADMIN — Medication PRN MG: at 06:27

## 2020-04-19 RX ADMIN — PANTOPRAZOLE SODIUM SCH MG: 40 TABLET, DELAYED RELEASE ORAL at 07:49

## 2020-04-19 RX ADMIN — Medication PRN MG: at 10:05

## 2020-04-19 RX ADMIN — NICOTINE SCH MG: 21 PATCH TRANSDERMAL at 18:19

## 2020-04-19 NOTE — NUR
MS RN NOTES: OPENING NOTES



RECEIVED REPORT FROM DAY SHIFT NURSE MALU. RECEIVED PT IN BED, AWAKE, A/O X3. UPON ARRIVAL 
PT IS ANGRY. PT IS HYPERVERBAL AND UPSET. PT STATED, "I DON'T WANT TO BE HERE AND I DO NOT 
WANT ANYTHING FROM YOU. I WANT TO LEAVE NOW." PT DENIES ANY PAIN OR DISCOMFORT AT THIS TIME. 
PT PICC LINE ON MOLINA AND RIGHT FA PERIPHERAL LINE INTACT. NO INFLITRATION OR REDNESS ON IV 
SITE.  CALL LIGHT KEPT WITHIN REACH. PT'S BED IN LOWEST, LOCKED POSITIN. WILL CONTINUE PLAN 
OF CARE.

## 2020-04-19 NOTE — NUR
MS RN CLOSING NOTES



ENDORSED PATIENT TO NIGHT SHIFT NURSE, IN BED, AWAKE, UNCOOPERATIVE, AGITATED, IVF RIGHT FA 
20G NS AT 75ML/HR INFUSING WELL, WITH PICC LINE MOLINA, NO SIGNS OF REDNESS OR INFILTRATION 
NOTED, BREATHING AT ROOM AIR, NO SIGNS OF RESPIRATORY DISTRESS.

## 2020-04-19 NOTE — NUR
MS RN NOTES



SPOKED WITH DR. THOMPSON AND EXPLAINED TO HIM THE PATIENT WAS AGITATED OF NOT BEING SEEN SINCE 
YESTERDAY MORNING. DR. THOMPSON TOLD ME TO INFORM THE PATIENT THAT HE IS NO FEELING WELL AND 
WOULD SEND HIS PA TOMORROW MORNING TO CHECK ON THE PATIENT.

## 2020-04-19 NOTE — NUR
MS RN - OPENING NOTES



RECEIVED PATIENT IN BED, AWAKE, COOPERATIVE, COHERENT, IVF RIGHT FA 20G NS AT 75ML/HR 
INFUSING WELL, BREATHING AT ROOM AIR, NO SIGNS OF RESPIRATORY DISTRESS, S/P LEFT KNEE I AND 
D, NO SIGNS OF BLEEDING NOTED.

## 2020-04-19 NOTE — NUR
rn notes:



1951: met with the pt, pt already out of the room, brought all belongings with him, pt was 
sitting by the hallway, doesnt to have his vs check, yelling to staff, screaming, voicing 
out his frustration, pt complaining that he felt neglected since the time he was admitted, 
claimed that he's been left out, and been requesting to speak with dr gaspar since 0400pm, 
stated nobody listen to his request. he also added that he's been calling out last night for 
40mins and nobody came to assist him as he's urinal was full and nobody came to empty it 
out. in addition, pt said he doesnt feel safe here because he had a room mate today who was 
coughing and he's afraid pt has some kind of infection, and that pt was contagious. pt kept 
saying "he feels he's contaminated and dirty" and that he wants to leave the hospital but 
he's not leaving AMA as he will not sign anything because the only reason why he wants to 
leave is because he doesnt feel safe. Pt's frustration and anger has been acknowledge. but 
because of his behavior and attitude, security was called. Explained to pt that it was his 
right to complain if he feels like he's not being taken care of properly and if feels 
neglected. Also the pt was provided an option to be transferred to a different room if he 
doesnt feel safe being in the same room with another patient however pt refused and stated 
the only place he feels comfortable is their house. During the conversation, pt said he will 
leave no matter what as he already booked an uber, but will not have his picc line remove, 
he will only allow us to remove the peripheral iv access he has. He also voiced out that he 
will be going to dr gaspar office in am. During this, charge rn of am stephane made 
aware of the situation.



2000: tried to call dr gaspar office, spoked with exchange relayed situation, per exchange 
last call from [facility] received was at 0430pm from andrés. they claimed that every 15mins they 
dispatch call to md to make sure they receive the message. unfortunately dr gaspar didnt pick 
up, per exchange wait from md's call as they will dispatch the message to him. 



2010: contacted hospitalist on call, exchange dispatch the message. awaiting for md to call 
back. 



2015: contacted alen pace np on her personal phone, made aware of the situation that 
pt wanting to leave the hospital due to feeling unsafe and dissatisfied with the care he 
receive as ortho hasn't seen him for 2days now. relayed to np that pt just had picc line 
inserted for iv atb treatment for 4-6weeks. per np she will notify ortho md that pt will be 
going to his office in am, and per np just follow the picc line protocol. np made aware that 
we already notified nacho reaves, and will do incident report per protocol. During the 
conversation with np, nacho hernandez also on the other line speaking with nacho reaves to 
made aware of the situation. also, during the phone conversation, rn saw pt stood up walked 
to the elevator and left the unit.  abdiel made aware to please call security 
downstairs to check the exit route and elevator as pt leaving with picc line in placed. 
notified ortho md in his own personal phone regarding pt's frustration and situation, and 
that he wants to leave the hospital. made him aware that pt has picc line in place for 
4-6weeks iv atb. per md he wasnt able to come as he's sick today and yesterday. I also 
informed md that according to pt he will go see him in his office tomorrow am. no further 
orders received from dr gaspar. 





2020: received call from security stated that they were not allowed to hold the pt if pt 
wants to leave. he said according to pt he's uber already arrived and he will go no matter 
what. according to security , pt walked away and went to [facility] and ride on a car.



2030: received call from hospitalist on call dr matti cottrell , relayed to him about the 
situation and that pt left the unit without signing ama form, and doesnt want his picc line 
to be remove, and that pt left with picc in placed for iv atb 4-6 treatment. per matti he will 
call the pt himself. nacho hernandez are aware of all the event. pt left ama. incident 
report submitted.